# Patient Record
Sex: FEMALE | Race: WHITE | URBAN - METROPOLITAN AREA
[De-identification: names, ages, dates, MRNs, and addresses within clinical notes are randomized per-mention and may not be internally consistent; named-entity substitution may affect disease eponyms.]

---

## 2010-12-27 PROBLEM — Z98.890: Noted: 2018-11-15

## 2017-11-02 ENCOUNTER — IMPORTED ENCOUNTER (OUTPATIENT)
Dept: URBAN - METROPOLITAN AREA CLINIC 50 | Facility: CLINIC | Age: 77
End: 2017-11-02

## 2017-11-09 ENCOUNTER — IMPORTED ENCOUNTER (OUTPATIENT)
Dept: URBAN - METROPOLITAN AREA CLINIC 50 | Facility: CLINIC | Age: 77
End: 2017-11-09

## 2017-12-04 ENCOUNTER — IMPORTED ENCOUNTER (OUTPATIENT)
Dept: URBAN - METROPOLITAN AREA CLINIC 50 | Facility: CLINIC | Age: 77
End: 2017-12-04

## 2017-12-11 ENCOUNTER — IMPORTED ENCOUNTER (OUTPATIENT)
Dept: URBAN - METROPOLITAN AREA CLINIC 50 | Facility: CLINIC | Age: 77
End: 2017-12-11

## 2017-12-18 ENCOUNTER — IMPORTED ENCOUNTER (OUTPATIENT)
Dept: URBAN - METROPOLITAN AREA CLINIC 50 | Facility: CLINIC | Age: 77
End: 2017-12-18

## 2018-10-30 ENCOUNTER — IMPORTED ENCOUNTER (OUTPATIENT)
Dept: URBAN - METROPOLITAN AREA CLINIC 50 | Facility: CLINIC | Age: 78
End: 2018-10-30

## 2018-11-15 ENCOUNTER — IMPORTED ENCOUNTER (OUTPATIENT)
Dept: URBAN - METROPOLITAN AREA CLINIC 50 | Facility: CLINIC | Age: 78
End: 2018-11-15

## 2018-11-28 ENCOUNTER — IMPORTED ENCOUNTER (OUTPATIENT)
Dept: URBAN - METROPOLITAN AREA CLINIC 50 | Facility: CLINIC | Age: 78
End: 2018-11-28

## 2018-11-28 NOTE — PATIENT DISCUSSION
"""Discussed dry eye diagnosis with patient.  Educated patient on proper lid hygiene and stressed importance of lid massages and the use of warm compresses and artificial tears."""

## 2019-12-05 ENCOUNTER — IMPORTED ENCOUNTER (OUTPATIENT)
Dept: URBAN - METROPOLITAN AREA CLINIC 50 | Facility: CLINIC | Age: 79
End: 2019-12-05

## 2021-01-07 ENCOUNTER — IMPORTED ENCOUNTER (OUTPATIENT)
Dept: URBAN - METROPOLITAN AREA CLINIC 50 | Facility: CLINIC | Age: 81
End: 2021-01-07

## 2021-01-07 NOTE — PATIENT DISCUSSION
"""Discussed blepharitis diagnosis with patient. Educated patient on proper lid hygiene and the use of warm compresses. "" ""Continue Baby shampoo both eyes at bedtime . "" ""Continue eyemycin both eyes at bedtime . "" ""Continue Alaway both eyes as needed . """

## 2021-04-17 ASSESSMENT — TONOMETRY
OD_IOP_MMHG: 15
OS_IOP_MMHG: 13
OS_IOP_MMHG: 12
OD_IOP_MMHG: 12
OS_IOP_MMHG: 17
OS_IOP_MMHG: 15
OS_IOP_MMHG: 12
OD_IOP_MMHG: 16
OS_IOP_MMHG: 15
OS_IOP_MMHG: 16
OD_IOP_MMHG: 14
OD_IOP_MMHG: 15
OD_IOP_MMHG: 13
OS_IOP_MMHG: 09

## 2021-04-17 ASSESSMENT — VISUAL ACUITY
OD_OTHER: 20/60. 20/70.
OD_PH: 20/25-1
OD_CC: J1+
OD_CC: 20/25
OD_CC: 20/25+2
OD_CC: 20/30+
OS_CC: J1
OD_CC: J2@ 16 IN
OS_CC: 20/25+2
OD_BAT: 20/60
OS_CC: 20/20
OS_CC: 20/30-
OS_BAT: 20/50
OD_CC: J1
OS_CC: 20/40
OS_CC: J1
OS_CC: 20/40+
OS_CC: 20/40
OS_OTHER: 20/50. 20/80.
OD_OTHER: 20/30. 20/30.
OD_CC: J1+
OD_CC: 20/30
OD_CC: 20/20
OS_CC: J1+
OS_CC: 20/30
OS_PH: 20/30
OS_PH: 20/30
OS_CC: J2@ 16 IN
OS_CC: 20/30-
OS_BAT: 20/50
OS_OTHER: 20/50. 20/80.
OD_CC: 20/30+
OD_CC: J1
OS_CC: J1+
OD_CC: 20/25
OD_BAT: 20/30
OD_CC: 20/25

## 2022-01-11 ENCOUNTER — PREPPED CHART (OUTPATIENT)
Dept: URBAN - METROPOLITAN AREA CLINIC 49 | Facility: CLINIC | Age: 82
End: 2022-01-11

## 2022-01-11 NOTE — PATIENT DISCUSSION
"""Discussed blepharitis diagnosis with patient. Educated patient on proper lid hygiene and the use of warm compresses. "" ""Continue Baby shampoo both eyes at bedtime . "" ""Continue eyemycin both eyes at bedtime . "" ""Continue Alaway both eyes as needed . "". "

## 2022-01-12 ENCOUNTER — COMPREHENSIVE EXAM (OUTPATIENT)
Dept: URBAN - METROPOLITAN AREA CLINIC 49 | Facility: CLINIC | Age: 82
End: 2022-01-12

## 2022-01-12 DIAGNOSIS — H01.00B: ICD-10-CM

## 2022-01-12 DIAGNOSIS — H01.00A: ICD-10-CM

## 2022-01-12 DIAGNOSIS — H04.123: ICD-10-CM

## 2022-01-12 DIAGNOSIS — H35.373: ICD-10-CM

## 2022-01-12 DIAGNOSIS — H43.811: ICD-10-CM

## 2022-01-12 PROCEDURE — 92014 COMPRE OPH EXAM EST PT 1/>: CPT

## 2022-01-12 PROCEDURE — 92134 CPTRZ OPH DX IMG PST SGM RTA: CPT

## 2022-01-12 ASSESSMENT — TONOMETRY
OS_IOP_MMHG: 13
OD_IOP_MMHG: 13

## 2022-01-12 ASSESSMENT — VISUAL ACUITY
OS_CC: 20/30
OD_CC: 20/25
OU_CC: J1

## 2022-01-12 NOTE — PATIENT DISCUSSION
Discussed blepharitis diagnosis with patient. Educated patient on proper lid hygiene and the use of warm compresses. Continue Baby shampoo both eyes at bedtime . Continue Eyemcin both eyes at bedtime . Continue Alaway both eyes as needed.

## 2023-07-12 ENCOUNTER — COMPREHENSIVE EXAM (OUTPATIENT)
Dept: URBAN - METROPOLITAN AREA CLINIC 49 | Facility: CLINIC | Age: 83
End: 2023-07-12

## 2023-07-12 DIAGNOSIS — H35.373: ICD-10-CM

## 2023-07-12 DIAGNOSIS — H04.123: ICD-10-CM

## 2023-07-12 DIAGNOSIS — H43.813: ICD-10-CM

## 2023-07-12 PROCEDURE — 92134 CPTRZ OPH DX IMG PST SGM RTA: CPT

## 2023-07-12 PROCEDURE — 92014 COMPRE OPH EXAM EST PT 1/>: CPT

## 2023-07-12 ASSESSMENT — VISUAL ACUITY
OD_GLARE: 20/30
OD_GLARE: 20/30
OS_SC: 20/25-2
OU_SC: 20/25
OD_SC: 20/20-2

## 2023-07-12 ASSESSMENT — TONOMETRY
OD_IOP_MMHG: 12
OS_IOP_MMHG: 13

## 2024-07-17 ENCOUNTER — COMPREHENSIVE EXAM (OUTPATIENT)
Dept: URBAN - METROPOLITAN AREA CLINIC 49 | Facility: LOCATION | Age: 84
End: 2024-07-17

## 2024-07-17 DIAGNOSIS — H43.813: ICD-10-CM

## 2024-07-17 DIAGNOSIS — H04.123: ICD-10-CM

## 2024-07-17 DIAGNOSIS — H53.16: ICD-10-CM

## 2024-07-17 DIAGNOSIS — H01.00A: ICD-10-CM

## 2024-07-17 DIAGNOSIS — H01.00B: ICD-10-CM

## 2024-07-17 DIAGNOSIS — H35.373: ICD-10-CM

## 2024-07-17 PROCEDURE — 92134 CPTRZ OPH DX IMG PST SGM RTA: CPT

## 2024-07-17 PROCEDURE — 99214 OFFICE O/P EST MOD 30 MIN: CPT

## 2024-07-17 ASSESSMENT — TONOMETRY
OD_IOP_MMHG: 11
OS_IOP_MMHG: 12

## 2024-07-17 ASSESSMENT — VISUAL ACUITY
OD_SC: 20/25
OS_SC: 20/25-1
OU_SC: 20/20
OU_SC: J5@16"

## 2025-07-23 ENCOUNTER — COMPREHENSIVE EXAM (OUTPATIENT)
Age: 85
End: 2025-07-23

## 2025-07-23 DIAGNOSIS — H01.02A: ICD-10-CM

## 2025-07-23 DIAGNOSIS — H04.123: ICD-10-CM

## 2025-07-23 DIAGNOSIS — H52.4: ICD-10-CM

## 2025-07-23 DIAGNOSIS — H43.813: ICD-10-CM

## 2025-07-23 DIAGNOSIS — H53.16: ICD-10-CM

## 2025-07-23 DIAGNOSIS — H01.02B: ICD-10-CM

## 2025-07-23 DIAGNOSIS — H35.373: ICD-10-CM

## 2025-07-23 PROCEDURE — 99214 OFFICE O/P EST MOD 30 MIN: CPT

## 2025-07-23 PROCEDURE — 92134 CPTRZ OPH DX IMG PST SGM RTA: CPT
